# Patient Record
Sex: MALE | Race: WHITE | NOT HISPANIC OR LATINO | URBAN - METROPOLITAN AREA
[De-identification: names, ages, dates, MRNs, and addresses within clinical notes are randomized per-mention and may not be internally consistent; named-entity substitution may affect disease eponyms.]

---

## 2018-10-16 ENCOUNTER — OFFICE VISIT (OUTPATIENT)
Dept: MEDICAL GROUP | Facility: MEDICAL CENTER | Age: 63
End: 2018-10-16
Payer: MEDICARE

## 2018-10-16 VITALS
OXYGEN SATURATION: 97 % | HEART RATE: 88 BPM | BODY MASS INDEX: 34.8 KG/M2 | SYSTOLIC BLOOD PRESSURE: 136 MMHG | HEIGHT: 69 IN | DIASTOLIC BLOOD PRESSURE: 72 MMHG | WEIGHT: 235 LBS | RESPIRATION RATE: 16 BRPM

## 2018-10-16 DIAGNOSIS — M51.36 DEGENERATION OF INTERVERTEBRAL DISC OF LUMBAR REGION: ICD-10-CM

## 2018-10-16 DIAGNOSIS — M96.1 FAILED BACK SYNDROME, LUMBAR: ICD-10-CM

## 2018-10-16 DIAGNOSIS — M81.0 OSTEOPOROSIS WITHOUT CURRENT PATHOLOGICAL FRACTURE, UNSPECIFIED OSTEOPOROSIS TYPE: ICD-10-CM

## 2018-10-16 PROCEDURE — 99203 OFFICE O/P NEW LOW 30 MIN: CPT | Performed by: NURSE PRACTITIONER

## 2018-10-16 RX ORDER — ALENDRONATE SODIUM 70 MG/1
70 TABLET ORAL
Qty: 4 TAB | Status: SHIPPED | DISCHARGE
Start: 2018-10-16

## 2018-10-16 RX ORDER — OXYCODONE HYDROCHLORIDE 10 MG/1
10 TABLET ORAL 2 TIMES DAILY
Qty: 60 TAB | Refills: 0 | COMMUNITY
Start: 2018-10-16

## 2018-10-16 ASSESSMENT — PATIENT HEALTH QUESTIONNAIRE - PHQ9: CLINICAL INTERPRETATION OF PHQ2 SCORE: 0

## 2018-10-16 ASSESSMENT — ENCOUNTER SYMPTOMS: BACK PAIN: 1

## 2018-10-16 NOTE — PROGRESS NOTES
Subjective:      Mark Soni is a 62 y.o. male who presents with Establish Care        CC: Patient is here today to establish care and for referral to a pain clinic.  He is a prior patient of Ms. Marie but has not been seen by her since 2015.  [facility].    HPI Mark Soni is here mainly because he wants a referral to a pain clinic for his chronic low back pain.  Review of history shows at one time he was going to  who he was happy with for treatment of his back pain.  He last saw him in 2016.  He more recently has been going to Dr. Sanchez who has been prescribing his 10 mg oxycodone which he was using about 4 times a day.  Apparently patient did not want to go forward with some testing and therefore his medications were cut off and he states he has not had medicines in 3 months.  He would like to get into a new pain management office for this.    Patient states he [facility] and his medication list was updated.    Social History   Substance Use Topics   • Smoking status: Current Every Day Smoker     Packs/day: 1.50     Types: Cigarettes   • Smokeless tobacco: Never Used   • Alcohol use No      Comment: 1-2 weekly, has past hx problem with ETOH per pt     Current Outpatient Prescriptions   Medication Sig Dispense Refill   • alendronate (FOSAMAX) 70 MG Tab Take 1 Tab by mouth every 7 days. 4 Tab    • oxyCODONE immediate release (ROXICODONE) 10 MG immediate release tablet Take 1 Tab by mouth 2 Times a Day. 60 Tab 0   • lisinopril (PRINIVIL) 10 MG Tab Take 10 mg by mouth every day.     • DOCUSATE CALCIUM PO Take  by mouth.     • polyethylene glycol/lytes (MIRALAX) Pack Take 17 g by mouth every day.     • cyanocobalamin (VITAMIN B-12) 1000 MCG/ML Solution 1,000 mcg by Intramuscular route every 30 days.     • ranitidine (ZANTAC) 150 MG capsule Take  by mouth.     • aspirin (ASA) 81 MG CHEW chewable tablet Take 81 mg  "by mouth every day.       No current facility-administered medications for this visit.      Family History   Problem Relation Age of Onset   • Heart Disease Mother    • Heart Disease Father    • Hypertension Father    • Cancer Brother         unknown ca   History reviewed. No pertinent past medical history.    Review of Systems   Musculoskeletal: Positive for back pain.   All other systems reviewed and are negative.         Objective:     /72 (BP Location: Right arm, Patient Position: Sitting, BP Cuff Size: Adult)   Pulse 88   Resp 16   Ht 1.753 m (5' 9\")   Wt 106.6 kg (235 lb)   SpO2 97%   BMI 34.70 kg/m²      Physical Exam   Constitutional: He is oriented to person, place, and time. He appears well-developed and well-nourished. No distress.   HENT:   Head: Normocephalic and atraumatic.   Right Ear: External ear normal.   Left Ear: External ear normal.   Nose: Nose normal.   Mouth/Throat: Oropharynx is clear and moist.   Eyes: Conjunctivae are normal. Right eye exhibits no discharge. Left eye exhibits no discharge.   Neck: Normal range of motion. Neck supple. No tracheal deviation present. No thyromegaly present.   Cardiovascular: Normal rate, regular rhythm and normal heart sounds.    No murmur heard.  Pulmonary/Chest: Effort normal and breath sounds normal. No respiratory distress. He has no wheezes. He has no rales.   Musculoskeletal:        Lumbar back: He exhibits decreased range of motion and pain.   Lymphadenopathy:     He has no cervical adenopathy.   Neurological: He is alert and oriented to person, place, and time. Coordination normal.   Skin: Skin is warm and dry. No rash noted. He is not diaphoretic. No erythema.   Psychiatric: He has a normal mood and affect. His behavior is normal. Judgment and thought content normal.   Patient was pleasant in the office but spent most the visit complaining about medical care in the area.   Nursing note and vitals reviewed.              Assessment/Plan: "     1. Degeneration of intervertebral disc of lumbar region  Patient requesting referral to pain management for treatment of his chronic back pain and also wanting to get back on his oxycodone which he has been using for years for his back pain.    - REFERRAL TO PAIN CLINIC    2. Failed back syndrome, lumbar    - REFERRAL TO PAIN CLINIC    3. Osteoporosis without current pathological fracture, unspecified osteoporosis type  Medication list updated and patient appears to [facility].  - alendronate (FOSAMAX) 70 MG Tab; Take 1 Tab by mouth every 7 days.  Dispense: 4 Tab    Patient may return to the office as needed but it appears that his lab work, medications and most of [facility] care is [facility].

## 2021-03-15 DIAGNOSIS — Z23 NEED FOR VACCINATION: ICD-10-CM

## 2023-06-14 ENCOUNTER — OFFICE VISIT (OUTPATIENT)
Dept: URGENT CARE | Facility: CLINIC | Age: 68
End: 2023-06-14
Payer: MEDICARE

## 2023-06-14 VITALS
SYSTOLIC BLOOD PRESSURE: 132 MMHG | HEIGHT: 70 IN | OXYGEN SATURATION: 98 % | RESPIRATION RATE: 16 BRPM | BODY MASS INDEX: 34.36 KG/M2 | TEMPERATURE: 97.9 F | WEIGHT: 240 LBS | HEART RATE: 69 BPM | DIASTOLIC BLOOD PRESSURE: 78 MMHG

## 2023-06-14 DIAGNOSIS — T16.1XXA FOREIGN BODY OF RIGHT EAR, INITIAL ENCOUNTER: ICD-10-CM

## 2023-06-14 PROCEDURE — 3078F DIAST BP <80 MM HG: CPT | Performed by: STUDENT IN AN ORGANIZED HEALTH CARE EDUCATION/TRAINING PROGRAM

## 2023-06-14 PROCEDURE — 99205 OFFICE O/P NEW HI 60 MIN: CPT | Performed by: STUDENT IN AN ORGANIZED HEALTH CARE EDUCATION/TRAINING PROGRAM

## 2023-06-14 PROCEDURE — 3075F SYST BP GE 130 - 139MM HG: CPT | Performed by: STUDENT IN AN ORGANIZED HEALTH CARE EDUCATION/TRAINING PROGRAM

## 2023-06-14 NOTE — PROGRESS NOTES
Subjective:   CHIEF COMPLAINT  Chief Complaint   Patient presents with    Foreign Body in Ear     R ear qtip stuck in canal.        HPI  Mark Soni is a 67 y.o. male who presents with a chief complaint of stuck cotton in his right ear from a Q-tip.  2 days ago he stuck a Q-tip in his ear subsequently getting cotton stuck in the canal.  He has tried soaking his had and water which has not helped.  Reports minimal pain.  There is been no drainage or discharge from the ear.    REVIEW OF SYSTEMS  General: no fever or chills  GI: no nausea or vomiting  See HPI for further details.    PAST MEDICAL HISTORY  Patient Active Problem List    Diagnosis Date Noted    Osteoporosis without current pathological fracture 10/16/2018    Failed back syndrome, lumbar 10/08/2015    Chronic back pain 10/08/2015    Lumbosacral spondylosis 10/08/2015    DDD (degenerative disc disease), lumbar 10/08/2015    Lumbar radiculopathy 10/08/2015    Muscle spasm of both lower legs 10/08/2015    Peripheral neuropathy 10/08/2015    Chronic fatigue 10/08/2015    Degeneration of intervertebral disc of lumbar region 07/20/2015    Low back pain 07/20/2015       SURGICAL HISTORY   has a past surgical history that includes other (1993).    ALLERGIES  Allergies   Allergen Reactions    Morphine        CURRENT MEDICATIONS  Home Medications       Reviewed by Demarcus Higuera D.O. (Physician) on 06/14/23 at 1406  Med List Status: <None>     Medication Last Dose Status   alendronate (FOSAMAX) 70 MG Tab Taking Active   aspirin (ASA) 81 MG CHEW chewable tablet Taking Active   cyanocobalamin (VITAMIN B-12) 1000 MCG/ML Solution Taking Active   DOCUSATE CALCIUM PO Taking Active   lisinopril (PRINIVIL) 10 MG Tab Taking Active   oxyCODONE immediate release (ROXICODONE) 10 MG immediate release tablet Taking Active   polyethylene glycol/lytes (MIRALAX) Pack Taking Active   ranitidine (ZANTAC) 150 MG capsule Taking Active                    SOCIAL HISTORY  Social History  "    Tobacco Use    Smoking status: Every Day     Packs/day: 1.50     Types: Cigarettes    Smokeless tobacco: Never   Substance and Sexual Activity    Alcohol use: No     Comment: 1-2 weekly, has past hx problem with ETOH per pt    Drug use: No    Sexual activity: Not on file       FAMILY HISTORY  Family History   Problem Relation Age of Onset    Heart Disease Mother     Heart Disease Father     Hypertension Father     Cancer Brother         unknown ca          Objective:   PHYSICAL EXAM  VITAL SIGNS: /78 (BP Location: Left arm, Patient Position: Sitting, BP Cuff Size: Adult)   Pulse 69   Temp 36.6 °C (97.9 °F) (Temporal)   Resp 16   Ht 1.778 m (5' 10\")   Wt 109 kg (240 lb)   SpO2 98%   BMI 34.44 kg/m²     Gen: no acute distress, normal voice  Skin: dry, intact, moist mucosal membranes  Eyes: No conjunctival injection b/l  Neck: Normal range of motion. No meningeal signs.   ENT: Right external canal demonstrated what appeared to be a small piece of cotton just adjacent to the tympanic membrane.  No surrounding erythema or edema of the external canal.  No dried serous discharge or blood.  Lungs: No increased work of breathing.  CTAB w/ symmetric expansion  CV: RRR w/o murmurs or clicks  Psych: normal affect, normal judgement, alert, awake    Assessment/Plan:     1. Foreign body of right ear, initial encounter        On examination with otoscope there appeared to be cotton abutting the tympanic membrane.  Under visualization with the otoscope I attempted to use a micro-alligator to remove the object however was unable to confidently clamp the object, therefore removed the alligator and had my medical assistant gently lavage the ear, which was also unsuccessful.  I then reexamined the ear to see if the foreign body was dislodged or more proximal in the canal however at this point I was unable to distinguish the foreign body from the tympanic membrane so discontinued with attempted removal and instructed " patient to go to the ED. TM was intact.     Differential diagnosis, natural history, supportive care, and indications for immediate follow-up discussed. All questions answered. Patient agrees with the plan of care.    Follow-up as needed if symptoms worsen or fail to improve to PCP, Urgent care or Emergency Room.    Please note that this dictation was created using voice recognition software. I have made a reasonable attempt to correct obvious errors, but I expect that there are errors of grammar and possibly content that I did not discover before finalizing the note.